# Patient Record
Sex: FEMALE | Race: WHITE | Employment: OTHER | URBAN - METROPOLITAN AREA
[De-identification: names, ages, dates, MRNs, and addresses within clinical notes are randomized per-mention and may not be internally consistent; named-entity substitution may affect disease eponyms.]

---

## 2022-11-09 ENCOUNTER — HOSPITAL ENCOUNTER (EMERGENCY)
Dept: GENERAL RADIOLOGY | Age: 82
Discharge: HOME | DRG: 287 | End: 2022-11-12
Payer: COMMERCIAL

## 2022-11-09 ENCOUNTER — HOSPITAL ENCOUNTER (INPATIENT)
Age: 82
LOS: 2 days | Discharge: HOME | DRG: 287 | End: 2022-11-11
Attending: EMERGENCY MEDICINE | Admitting: INTERNAL MEDICINE
Payer: COMMERCIAL

## 2022-11-09 ENCOUNTER — APPOINTMENT (OUTPATIENT)
Dept: NON INVASIVE DIAGNOSTICS | Age: 82
DRG: 287 | End: 2022-11-09
Payer: COMMERCIAL

## 2022-11-09 DIAGNOSIS — R07.9 CHEST PAIN: ICD-10-CM

## 2022-11-09 DIAGNOSIS — I24.9 ACS (ACUTE CORONARY SYNDROME) (HCC): Primary | ICD-10-CM

## 2022-11-09 DIAGNOSIS — I10 HYPERTENSION, UNCONTROLLED: ICD-10-CM

## 2022-11-09 DIAGNOSIS — R00.1 BRADYCARDIA: ICD-10-CM

## 2022-11-09 LAB
25(OH)D3 SERPL-MCNC: 49.6 NG/ML (ref 30–100)
ALBUMIN SERPL-MCNC: 4.4 G/DL (ref 3.2–4.6)
ALBUMIN/GLOB SERPL: 1.5 {RATIO} (ref 0.4–1.6)
ALP SERPL-CCNC: 85 U/L (ref 50–136)
ALT SERPL-CCNC: 23 U/L (ref 12–65)
ANION GAP SERPL CALC-SCNC: 2 MMOL/L (ref 2–11)
AST SERPL-CCNC: 15 U/L (ref 15–37)
BILIRUB SERPL-MCNC: 0.5 MG/DL (ref 0.2–1.1)
BUN SERPL-MCNC: 13 MG/DL (ref 8–23)
CALCIUM SERPL-MCNC: 9.8 MG/DL (ref 8.3–10.4)
CHLORIDE SERPL-SCNC: 106 MMOL/L (ref 101–110)
CHOLEST SERPL-MCNC: 132 MG/DL
CO2 SERPL-SCNC: 31 MMOL/L (ref 21–32)
CREAT SERPL-MCNC: 0.9 MG/DL (ref 0.6–1)
ECHO AO ASC DIAM: 3.8 CM
ECHO AO ASCENDING AORTA INDEX: 2.38 CM/M2
ECHO AO ROOT DIAM: 3.3 CM
ECHO AO ROOT INDEX: 2.06 CM/M2
ECHO AR MAX VEL PISA: 4.3 M/S
ECHO AV AREA PEAK VELOCITY: 2.4 CM2
ECHO AV AREA VTI: 2.3 CM2
ECHO AV AREA/BSA PEAK VELOCITY: 1.5 CM2/M2
ECHO AV AREA/BSA VTI: 1.4 CM2/M2
ECHO AV MEAN GRADIENT: 9 MMHG
ECHO AV MEAN VELOCITY: 1.4 M/S
ECHO AV PEAK GRADIENT: 20 MMHG
ECHO AV PEAK VELOCITY: 2.2 M/S
ECHO AV REGURGITANT PHT: 629 MS
ECHO AV VELOCITY RATIO: 0.64
ECHO AV VTI: 46.9 CM
ECHO BSA: 1.63 M2
ECHO EST RA PRESSURE: 5 MMHG
ECHO LA AREA 2C: 26.5 CM2
ECHO LA AREA 4C: 26.6 CM2
ECHO LA MAJOR AXIS: 6.5 CM
ECHO LA MINOR AXIS: 6.6 CM
ECHO LA VOL 2C: 89 ML (ref 22–52)
ECHO LA VOL 4C: 86 ML (ref 22–52)
ECHO LA VOL BP: 88 ML (ref 22–52)
ECHO LA VOL/BSA BIPLANE: 55 ML/M2 (ref 16–34)
ECHO LA VOLUME INDEX A2C: 56 ML/M2 (ref 16–34)
ECHO LA VOLUME INDEX A4C: 54 ML/M2 (ref 16–34)
ECHO LV E' LATERAL VELOCITY: 6 CM/S
ECHO LV E' SEPTAL VELOCITY: 4 CM/S
ECHO LV EDV A2C: 53 ML
ECHO LV EDV A4C: 83 ML
ECHO LV EDV INDEX A4C: 52 ML/M2
ECHO LV EDV NDEX A2C: 33 ML/M2
ECHO LV EJECTION FRACTION A2C: 60 %
ECHO LV EJECTION FRACTION A4C: 62 %
ECHO LV EJECTION FRACTION BIPLANE: 62 % (ref 55–100)
ECHO LV ESV A2C: 21 ML
ECHO LV ESV A4C: 31 ML
ECHO LV ESV INDEX A2C: 13 ML/M2
ECHO LV ESV INDEX A4C: 19 ML/M2
ECHO LV FRACTIONAL SHORTENING: 30 % (ref 28–44)
ECHO LV INTERNAL DIMENSION DIASTOLE INDEX: 2.5 CM/M2
ECHO LV INTERNAL DIMENSION DIASTOLIC: 4 CM (ref 3.9–5.3)
ECHO LV INTERNAL DIMENSION SYSTOLIC INDEX: 1.75 CM/M2
ECHO LV INTERNAL DIMENSION SYSTOLIC: 2.8 CM
ECHO LV IVSD: 1 CM (ref 0.6–0.9)
ECHO LV MASS 2D: 136.2 G (ref 67–162)
ECHO LV MASS INDEX 2D: 85.1 G/M2 (ref 43–95)
ECHO LV POSTERIOR WALL DIASTOLIC: 1.1 CM (ref 0.6–0.9)
ECHO LV RELATIVE WALL THICKNESS RATIO: 0.55
ECHO LVOT AREA: 3.8 CM2
ECHO LVOT AV VTI INDEX: 0.6
ECHO LVOT DIAM: 2.2 CM
ECHO LVOT MEAN GRADIENT: 3 MMHG
ECHO LVOT PEAK GRADIENT: 8 MMHG
ECHO LVOT PEAK VELOCITY: 1.4 M/S
ECHO LVOT STROKE VOLUME INDEX: 66.5 ML/M2
ECHO LVOT SV: 106.4 ML
ECHO LVOT VTI: 28 CM
ECHO MV A VELOCITY: 0.78 M/S
ECHO MV E DECELERATION TIME (DT): 229 MS
ECHO MV E VELOCITY: 0.79 M/S
ECHO MV E/A RATIO: 1.01
ECHO MV E/E' LATERAL: 13.17
ECHO MV E/E' RATIO (AVERAGED): 16.46
ECHO MV E/E' SEPTAL: 19.75
ECHO PV ACCELERATION TIME (AT): 106 MS
ECHO PV MAX VELOCITY: 1.2 M/S
ECHO PV PEAK GRADIENT: 5 MMHG
ECHO RIGHT VENTRICULAR SYSTOLIC PRESSURE (RVSP): 33 MMHG
ECHO RV BASAL DIMENSION: 3.4 CM
ECHO RV FREE WALL PEAK S': 14 CM/S
ECHO RV TAPSE: 1.8 CM (ref 1.7–?)
ECHO TV REGURGITANT MAX VELOCITY: 2.63 M/S
ECHO TV REGURGITANT PEAK GRADIENT: 28 MMHG
EKG ATRIAL RATE: 46 BPM
EKG DIAGNOSIS: NORMAL
EKG P AXIS: 42 DEGREES
EKG P-R INTERVAL: 158 MS
EKG Q-T INTERVAL: 480 MS
EKG QRS DURATION: 88 MS
EKG QTC CALCULATION (BAZETT): 420 MS
EKG R AXIS: 11 DEGREES
EKG T AXIS: 4 DEGREES
EKG VENTRICULAR RATE: 46 BPM
ERYTHROCYTE [DISTWIDTH] IN BLOOD BY AUTOMATED COUNT: 13.1 % (ref 11.9–14.6)
GLOBULIN SER CALC-MCNC: 2.9 G/DL (ref 2.8–4.5)
GLUCOSE SERPL-MCNC: 108 MG/DL (ref 65–100)
HCT VFR BLD AUTO: 41.6 % (ref 35.8–46.3)
HDLC SERPL-MCNC: 49 MG/DL (ref 40–60)
HDLC SERPL: 2.7 {RATIO}
HGB BLD-MCNC: 13.6 G/DL (ref 11.7–15.4)
LDLC SERPL CALC-MCNC: 69.2 MG/DL
LV EF: 63 %
LVEF MODALITY: ABNORMAL
MAGNESIUM SERPL-MCNC: 2.2 MG/DL (ref 1.8–2.4)
MCH RBC QN AUTO: 30.9 PG (ref 26.1–32.9)
MCHC RBC AUTO-ENTMCNC: 32.7 G/DL (ref 31.4–35)
MCV RBC AUTO: 94.5 FL (ref 82–102)
NRBC # BLD: 0 K/UL (ref 0–0.2)
PLATELET # BLD AUTO: 216 K/UL (ref 150–450)
PMV BLD AUTO: 9.4 FL (ref 9.4–12.3)
POTASSIUM SERPL-SCNC: 3.7 MMOL/L (ref 3.5–5.1)
PROT SERPL-MCNC: 7.3 G/DL (ref 6.3–8.2)
RBC # BLD AUTO: 4.4 M/UL (ref 4.05–5.2)
SODIUM SERPL-SCNC: 139 MMOL/L (ref 133–143)
TRIGL SERPL-MCNC: 69 MG/DL (ref 35–150)
TROPONIN I SERPL HS-MCNC: 10.3 PG/ML (ref 0–14)
TROPONIN I SERPL HS-MCNC: 11.6 PG/ML (ref 0–14)
TSH W FREE THYROID IF ABNORMAL: 0.7 UIU/ML (ref 0.36–3.74)
VLDLC SERPL CALC-MCNC: 13.8 MG/DL (ref 6–23)
WBC # BLD AUTO: 2.7 K/UL (ref 4.3–11.1)

## 2022-11-09 PROCEDURE — 85027 COMPLETE CBC AUTOMATED: CPT

## 2022-11-09 PROCEDURE — 36415 COLL VENOUS BLD VENIPUNCTURE: CPT

## 2022-11-09 PROCEDURE — 93306 TTE W/DOPPLER COMPLETE: CPT

## 2022-11-09 PROCEDURE — 6370000000 HC RX 637 (ALT 250 FOR IP): Performed by: INTERNAL MEDICINE

## 2022-11-09 PROCEDURE — 2580000003 HC RX 258: Performed by: INTERNAL MEDICINE

## 2022-11-09 PROCEDURE — 99223 1ST HOSP IP/OBS HIGH 75: CPT | Performed by: INTERNAL MEDICINE

## 2022-11-09 PROCEDURE — 84443 ASSAY THYROID STIM HORMONE: CPT

## 2022-11-09 PROCEDURE — 1100000003 HC PRIVATE W/ TELEMETRY

## 2022-11-09 PROCEDURE — 84484 ASSAY OF TROPONIN QUANT: CPT

## 2022-11-09 PROCEDURE — 80061 LIPID PANEL: CPT

## 2022-11-09 PROCEDURE — 71045 X-RAY EXAM CHEST 1 VIEW: CPT

## 2022-11-09 PROCEDURE — 99285 EMERGENCY DEPT VISIT HI MDM: CPT

## 2022-11-09 PROCEDURE — 93306 TTE W/DOPPLER COMPLETE: CPT | Performed by: INTERNAL MEDICINE

## 2022-11-09 PROCEDURE — 93005 ELECTROCARDIOGRAM TRACING: CPT | Performed by: EMERGENCY MEDICINE

## 2022-11-09 PROCEDURE — 82306 VITAMIN D 25 HYDROXY: CPT

## 2022-11-09 PROCEDURE — 80053 COMPREHEN METABOLIC PANEL: CPT

## 2022-11-09 PROCEDURE — 6360000002 HC RX W HCPCS: Performed by: INTERNAL MEDICINE

## 2022-11-09 PROCEDURE — 83735 ASSAY OF MAGNESIUM: CPT

## 2022-11-09 PROCEDURE — 6370000000 HC RX 637 (ALT 250 FOR IP): Performed by: EMERGENCY MEDICINE

## 2022-11-09 RX ORDER — VALSARTAN 40 MG/1
40 TABLET ORAL DAILY
Status: DISCONTINUED | OUTPATIENT
Start: 2022-11-09 | End: 2022-11-10

## 2022-11-09 RX ORDER — ONDANSETRON 4 MG/1
4 TABLET, ORALLY DISINTEGRATING ORAL EVERY 8 HOURS PRN
Status: DISCONTINUED | OUTPATIENT
Start: 2022-11-09 | End: 2022-11-11 | Stop reason: HOSPADM

## 2022-11-09 RX ORDER — ASPIRIN 81 MG/1
324 TABLET, CHEWABLE ORAL ONCE
Status: COMPLETED | OUTPATIENT
Start: 2022-11-09 | End: 2022-11-09

## 2022-11-09 RX ORDER — SODIUM CHLORIDE 9 MG/ML
INJECTION, SOLUTION INTRAVENOUS PRN
Status: DISCONTINUED | OUTPATIENT
Start: 2022-11-09 | End: 2022-11-11 | Stop reason: HOSPADM

## 2022-11-09 RX ORDER — ASPIRIN 81 MG/1
81 TABLET, CHEWABLE ORAL DAILY
Status: DISCONTINUED | OUTPATIENT
Start: 2022-11-10 | End: 2022-11-10 | Stop reason: SDUPTHER

## 2022-11-09 RX ORDER — SODIUM CHLORIDE 0.9 % (FLUSH) 0.9 %
5-40 SYRINGE (ML) INJECTION PRN
Status: DISCONTINUED | OUTPATIENT
Start: 2022-11-09 | End: 2022-11-11 | Stop reason: HOSPADM

## 2022-11-09 RX ORDER — ACETAMINOPHEN 650 MG/1
650 SUPPOSITORY RECTAL EVERY 6 HOURS PRN
Status: DISCONTINUED | OUTPATIENT
Start: 2022-11-09 | End: 2022-11-11 | Stop reason: HOSPADM

## 2022-11-09 RX ORDER — POLYETHYLENE GLYCOL 3350 17 G/17G
17 POWDER, FOR SOLUTION ORAL DAILY PRN
Status: DISCONTINUED | OUTPATIENT
Start: 2022-11-09 | End: 2022-11-11 | Stop reason: HOSPADM

## 2022-11-09 RX ORDER — ASPIRIN 81 MG/1
81 TABLET, CHEWABLE ORAL DAILY
Status: DISCONTINUED | OUTPATIENT
Start: 2022-11-10 | End: 2022-11-11 | Stop reason: HOSPADM

## 2022-11-09 RX ORDER — ONDANSETRON 2 MG/ML
4 INJECTION INTRAMUSCULAR; INTRAVENOUS EVERY 6 HOURS PRN
Status: DISCONTINUED | OUTPATIENT
Start: 2022-11-09 | End: 2022-11-11 | Stop reason: HOSPADM

## 2022-11-09 RX ORDER — ENOXAPARIN SODIUM 100 MG/ML
40 INJECTION SUBCUTANEOUS DAILY
Status: DISCONTINUED | OUTPATIENT
Start: 2022-11-09 | End: 2022-11-11 | Stop reason: HOSPADM

## 2022-11-09 RX ORDER — SODIUM CHLORIDE 0.9 % (FLUSH) 0.9 %
5-40 SYRINGE (ML) INJECTION EVERY 12 HOURS SCHEDULED
Status: DISCONTINUED | OUTPATIENT
Start: 2022-11-09 | End: 2022-11-11 | Stop reason: HOSPADM

## 2022-11-09 RX ORDER — ACETAMINOPHEN 325 MG/1
650 TABLET ORAL EVERY 6 HOURS PRN
Status: DISCONTINUED | OUTPATIENT
Start: 2022-11-09 | End: 2022-11-11 | Stop reason: HOSPADM

## 2022-11-09 RX ORDER — ATORVASTATIN CALCIUM 40 MG/1
40 TABLET, FILM COATED ORAL NIGHTLY
Status: DISCONTINUED | OUTPATIENT
Start: 2022-11-09 | End: 2022-11-11 | Stop reason: HOSPADM

## 2022-11-09 RX ADMIN — ACETAMINOPHEN 650 MG: 325 TABLET ORAL at 21:37

## 2022-11-09 RX ADMIN — VALSARTAN 40 MG: 40 TABLET, FILM COATED ORAL at 15:52

## 2022-11-09 RX ADMIN — ATORVASTATIN CALCIUM 40 MG: 40 TABLET, FILM COATED ORAL at 21:28

## 2022-11-09 RX ADMIN — ENOXAPARIN SODIUM 40 MG: 100 INJECTION SUBCUTANEOUS at 15:56

## 2022-11-09 RX ADMIN — ASPIRIN 324 MG: 81 TABLET, CHEWABLE ORAL at 14:34

## 2022-11-09 RX ADMIN — NITROGLYCERIN 1 INCH: 20 OINTMENT TOPICAL at 14:31

## 2022-11-09 RX ADMIN — SODIUM CHLORIDE, PRESERVATIVE FREE 10 ML: 5 INJECTION INTRAVENOUS at 21:28

## 2022-11-09 ASSESSMENT — ENCOUNTER SYMPTOMS
NAUSEA: 1
SHORTNESS OF BREATH: 1

## 2022-11-09 ASSESSMENT — PAIN - FUNCTIONAL ASSESSMENT: PAIN_FUNCTIONAL_ASSESSMENT: NONE - DENIES PAIN

## 2022-11-09 ASSESSMENT — PAIN SCALES - GENERAL: PAINLEVEL_OUTOF10: 3

## 2022-11-09 ASSESSMENT — PAIN DESCRIPTION - LOCATION: LOCATION: HEAD

## 2022-11-09 NOTE — ED TRIAGE NOTES
Pt ambulatory to triage with family for reports of low heart rate. Pt family states they went to their doctors office & found that her heart rate was 43. Pt takes BP medications but has never had a low heart rate before. Pt denies chest pain at this time but has had chest pains intermittently for the past 2 weeks. Pt also hypertensive at doctors office but has not yet taken her medications this morning. Pt a&ox4.

## 2022-11-09 NOTE — Clinical Note
TRANSFER - IN REPORT:     Verbal report received from: Room 302 RN. Report consisted of patient's Situation, Background, Assessment and   Recommendations(SBAR). Opportunity for questions and clarification was provided. Assessment completed upon patient's arrival to unit and care assumed. Patient transported with a Registered Nurse.

## 2022-11-09 NOTE — ED PROVIDER NOTES
Emergency Department Provider Note                   PCP:                No primary care provider on file. Age: [de-identified] y.o. Sex: female       ICD-10-CM    1. ACS (acute coronary syndrome) (HCC)  I24.9       2. Bradycardia  R00.1       3. Hypertension, uncontrolled  I10           DISPOSITION Decision To Admit 11/09/2022 02:37:05 PM       MDM  Number of Diagnoses or Management Options  ACS (acute coronary syndrome) (HCC)  Bradycardia  Chest pain  Hypertension, uncontrolled  Diagnosis management comments: Patient with EKG here that shows a sinus bradycardia versus 2-1 block versus U wave versus atrial ectopic beat. Her chest pain is concerning for cardiac chest pain. Aspirin given and nitropaste applied. BP improved. 2:30 PM  D/w Dr. Juan Roman for admission.        Amount and/or Complexity of Data Reviewed  Clinical lab tests: ordered and reviewed  Tests in the radiology section of CPT®: ordered and reviewed  Review and summarize past medical records: yes  Discuss the patient with other providers: yes  Independent visualization of images, tracings, or specimens: yes    Risk of Complications, Morbidity, and/or Mortality  Presenting problems: high  Diagnostic procedures: minimal  Management options: high    Patient Progress  Patient progress: improved             Orders Placed This Encounter   Procedures    XR CHEST PORTABLE    CBC    Comprehensive Metabolic Panel    Troponin    Magnesium    Cardiac Monitor    Pulse Oximetry    EKG 12 Lead    Saline lock IV        Medications   nitroglycerin (NITRO-BID) 2 % ointment 1 inch (1 inch Topical Given 11/9/22 1431)   aspirin chewable tablet 324 mg (324 mg Oral Given 11/9/22 1434)       New Prescriptions    No medications on file        Santiago Jerry is a [de-identified] y.o. female who presents to the Emergency Department with chief complaint of    Chief Complaint   Patient presents with    Bradycardia      Presents with complaint of low heart rate and blood pressure. Patient has not taken her blood pressure medicines today. She last took them at 8 PM yesterday. Patient was at her doctor's office for regular checkup and it was noted her heart rate was low and her blood pressure was high. She has not noticed her rate being low. She reports for the past 15 days that she has had intermittent chest pain that has increased in frequency to approximately 4-5 times per day. It lasts 15 to 30 minutes at a time. It is worse with exertion. She gets short of breath nauseated and diaphoretic with the pain. She describes the pain as a pressure and also causes a headache. She has not tried anything to help with the pain. She has not had any new medication changes. She does not smoke or drink. She does not have any family history of heart disease. She has never seen a cardiologist.  She was seen with the  and her daughter who speaks [place]. The history is provided by the patient and a relative. The history is limited by a language barrier. A  was used. All other systems reviewed and are negative unless otherwise stated in the history of present illness section. Review of Systems   Constitutional:  Negative for chills and fever. Respiratory:  Positive for shortness of breath. Cardiovascular:  Positive for chest pain. Negative for palpitations and leg swelling. Gastrointestinal:  Positive for nausea. Skin:  Negative for rash and wound. Neurological:  Positive for headaches. Negative for syncope. Psychiatric/Behavioral:  Negative for agitation and behavioral problems. All other systems reviewed and are negative. No past medical history on file. No past surgical history on file. No family history on file.      Social History     Socioeconomic History    Marital status: Unknown        Allergies: Penicillins    Previous Medications    No medications on file        Vitals signs and nursing note reviewed. Patient Vitals for the past 4 hrs:   Temp Pulse Resp BP SpO2   11/09/22 1230 98.3 °F (36.8 °C) (!) 49 18 (!) 207/99 98 %          Physical Exam  Vitals and nursing note reviewed. Constitutional:       General: She is not in acute distress. Appearance: Normal appearance. She is obese. She is not ill-appearing. HENT:      Head: Normocephalic and atraumatic. Nose: Nose normal.      Mouth/Throat:      Mouth: Mucous membranes are moist.      Pharynx: Oropharynx is clear. Eyes:      General:         Right eye: No discharge. Left eye: No discharge. Conjunctiva/sclera: Conjunctivae normal.   Cardiovascular:      Rate and Rhythm: Normal rate and regular rhythm. Comments: She has mild tenderness with palpation  Pulmonary:      Effort: Pulmonary effort is normal. No respiratory distress. Breath sounds: Normal breath sounds. No wheezing. Abdominal:      Palpations: Abdomen is soft. Tenderness: There is no abdominal tenderness. There is no guarding. Musculoskeletal:         General: Normal range of motion. Cervical back: Normal range of motion and neck supple. Right lower leg: No edema. Left lower leg: No edema. Skin:     General: Skin is warm and dry. Neurological:      General: No focal deficit present. Mental Status: She is alert and oriented to person, place, and time. Psychiatric:         Mood and Affect: Mood normal.         Behavior: Behavior normal.        Procedures    ED EKG Interpretation  EKG was interpreted in the absence of a cardiologist.    Rate: 46  EKG Interpretation: bradycardia with ? block  ST Segments: Nonspecific ST segments - NO STEMI    Results for orders placed or performed during the hospital encounter of 11/09/22   XR CHEST PORTABLE    Narrative    XR CHEST PORTABLE 11/9/2022 1:02 PM    HISTORY: Chest pain. Bradycardia. COMPARISON: None available. A portable AP view of the chest was obtained.       Impression The lungs are clear. Cardiomegaly. No pneumothorax. No pleural  effusions. CBC   Result Value Ref Range    WBC 2.7 (L) 4.3 - 11.1 K/uL    RBC 4.40 4.05 - 5.2 M/uL    Hemoglobin 13.6 11.7 - 15.4 g/dL    Hematocrit 41.6 35.8 - 46.3 %    MCV 94.5 82 - 102 FL    MCH 30.9 26.1 - 32.9 PG    MCHC 32.7 31.4 - 35.0 g/dL    RDW 13.1 11.9 - 14.6 %    Platelets 917 551 - 810 K/uL    MPV 9.4 9.4 - 12.3 FL    nRBC 0.00 0.0 - 0.2 K/uL   Comprehensive Metabolic Panel   Result Value Ref Range    Sodium 139 133 - 143 mmol/L    Potassium 3.7 3.5 - 5.1 mmol/L    Chloride 106 101 - 110 mmol/L    CO2 31 21 - 32 mmol/L    Anion Gap 2 2 - 11 mmol/L    Glucose 108 (H) 65 - 100 mg/dL    BUN 13 8 - 23 MG/DL    Creatinine 0.90 0.6 - 1.0 MG/DL    Est, Glom Filt Rate >60 >60 ml/min/1.73m2    Calcium 9.8 8.3 - 10.4 MG/DL    Total Bilirubin 0.5 0.2 - 1.1 MG/DL    ALT 23 12 - 65 U/L    AST 15 15 - 37 U/L    Alk Phosphatase 85 50 - 136 U/L    Total Protein 7.3 6.3 - 8.2 g/dL    Albumin 4.4 3.2 - 4.6 g/dL    Globulin 2.9 2.8 - 4.5 g/dL    Albumin/Globulin Ratio 1.5 0.4 - 1.6     Troponin   Result Value Ref Range    Troponin, High Sensitivity 10.3 0 - 14 pg/mL   Magnesium   Result Value Ref Range    Magnesium 2.2 1.8 - 2.4 mg/dL   EKG 12 Lead   Result Value Ref Range    Ventricular Rate 46 BPM    Atrial Rate 46 BPM    P-R Interval 158 ms    QRS Duration 88 ms    Q-T Interval 480 ms    QTc Calculation (Bazett) 420 ms    P Axis 42 degrees    R Axis 11 degrees    T Axis 4 degrees    Diagnosis Sinus bradycardia         XR CHEST PORTABLE   Final Result   The lungs are clear. Cardiomegaly. No pneumothorax. No pleural   effusions. Voice dictation software was used during the making of this note. This software is not perfect and grammatical and other typographical errors may be present. This note has not been completely proofread for errors.      Sabino Menon MD  11/09/22 3041

## 2022-11-09 NOTE — Clinical Note
After multiple catheter exchanges and unsuccessful attempts unable to access ostium of the RCA. Aborted right radial access and accessed the right groin.

## 2022-11-09 NOTE — H&P
Subjective:     Patient is a [de-identified] y.o.  female presents withchest pain. Onset of symptoms was gradual 2 weeks ago with gradually worsening course since that time. The chest pain is intermittent (10-20 minutes) and is rated as moderate. The chest pain. is random in occurrence and patient denies noticing it worsening with exertion. Symptoms improve with  nothing . Pt reports that she has been having intermittent episodes of sharp stabbing chest pain over the last two weeks. She states it occurs randomly and has not noticed any pattern to when it worsens. She reports feeling associated shortness of breath with her chest pain. No history of cardiac disease or other similar episodes in the past. Seen  at her PCP today and was noted to be bradycardic with rates in the 40s, and hypertensive with systolic blood pressures over 200. Pt reports that her last dose of BP medication was last night. There are no problems to display for this patient. No past medical history on file. No past surgical history on file. Not in a hospital admission. Allergies   Allergen Reactions    Penicillins Hives, Rash and Other (See Comments)      Social History     Tobacco Use    Smoking status: Not on file    Smokeless tobacco: Not on file   Substance Use Topics    Alcohol use: Not on file      No family history on file. Review of Systems  A comprehensive review of systems was negative except for: Respiratory: positive for shortness of breath  Cardiovascular: positive for chest pain    Objective:     Patient Vitals for the past 8 hrs:   BP Temp Temp src Pulse Resp SpO2 Height Weight   11/09/22 1230 (!) 207/99 98.3 °F (36.8 °C) Oral (!) 49 18 98 % 5' (1.524 m) 139 lb (63 kg)     No intake/output data recorded. No intake/output data recorded. Physical Exam:    Physical Examination: General appearance - Appearance: alert, well appearing, and in no distress.    Neck/lymph - supple, no significant adenopathy  Chest/CV - clear to auscultation, no wheezes, rales or rhonchi, symmetric air entry  Heart - bradycardic at 49 bpm, regular rhythm, normal S1, S2, no murmurs, rubs, clicks or gallops  Abdomen/GI - soft, nontender, nondistended, no masses or organomegaly   Musculoskeletal - no joint tenderness, deformity or swelling  Extremities - no pedal edema, no clubbing or cyanosis  Skin - normal coloration and turgor, no rashes, no suspicious skin lesions noted      Data included below includes an independent review by me    ECG: sinus bradycardia, no significant change from prior other than rate     Data Review:     Recent Results (from the past 24 hour(s))   EKG 12 Lead    Collection Time: 11/09/22 12:25 PM   Result Value Ref Range    Ventricular Rate 46 BPM    Atrial Rate 46 BPM    P-R Interval 158 ms    QRS Duration 88 ms    Q-T Interval 480 ms    QTc Calculation (Bazett) 420 ms    P Axis 42 degrees    R Axis 11 degrees    T Axis 4 degrees    Diagnosis Sinus bradycardia    CBC    Collection Time: 11/09/22 12:39 PM   Result Value Ref Range    WBC 2.7 (L) 4.3 - 11.1 K/uL    RBC 4.40 4.05 - 5.2 M/uL    Hemoglobin 13.6 11.7 - 15.4 g/dL    Hematocrit 41.6 35.8 - 46.3 %    MCV 94.5 82 - 102 FL    MCH 30.9 26.1 - 32.9 PG    MCHC 32.7 31.4 - 35.0 g/dL    RDW 13.1 11.9 - 14.6 %    Platelets 417 621 - 082 K/uL    MPV 9.4 9.4 - 12.3 FL    nRBC 0.00 0.0 - 0.2 K/uL   Comprehensive Metabolic Panel    Collection Time: 11/09/22 12:39 PM   Result Value Ref Range    Sodium 139 133 - 143 mmol/L    Potassium 3.7 3.5 - 5.1 mmol/L    Chloride 106 101 - 110 mmol/L    CO2 31 21 - 32 mmol/L    Anion Gap 2 2 - 11 mmol/L    Glucose 108 (H) 65 - 100 mg/dL    BUN 13 8 - 23 MG/DL    Creatinine 0.90 0.6 - 1.0 MG/DL    Est, Glom Filt Rate >60 >60 ml/min/1.73m2    Calcium 9.8 8.3 - 10.4 MG/DL    Total Bilirubin 0.5 0.2 - 1.1 MG/DL    ALT 23 12 - 65 U/L    AST 15 15 - 37 U/L    Alk Phosphatase 85 50 - 136 U/L    Total Protein 7.3 6.3 - 8.2 g/dL    Albumin 4.4 3.2 - 4.6 g/dL    Globulin 2.9 2.8 - 4.5 g/dL    Albumin/Globulin Ratio 1.5 0.4 - 1.6     Troponin    Collection Time: 11/09/22 12:39 PM   Result Value Ref Range    Troponin, High Sensitivity 10.3 0 - 14 pg/mL   Magnesium    Collection Time: 11/09/22 12:39 PM   Result Value Ref Range    Magnesium 2.2 1.8 - 2.4 mg/dL       Chest X-ray was negative for infiltrate, effusion, pneumothorax, and was positive for cardiomegaly    Assessment:     [unfilled]     Chest Pain:  - Patient reports intermittent episodes of chest pain over the last two weeks, described to the ER as being worse with exertion and associated with shortness of breath, diaphoresis, and nausea. Seen  at her PCP today and was noted to be bradycardic with rates in the 40s, and hypertensive with systolic blood pressures over 200. Pt reports that her last dose of BP medication was last night. - Pt stated to me that her chest pain is random in nature, unclear which is more accurate. Pt has not had cardiac work up in the past so will pursue non-invasive work  up of her chest pain. - Start pt on ASA 81 mg daily, given 324 mg of aspirin here in the ED  - Check echocardiogram and lipid panel    Bradycardia:  - Pt's HR consistently in the 40s, avoid AV nik blockers including beta blockers. - Monitor on telemetry    Hypertension:  - Poorly controlled, pt's granddaughter reports that it has not been well controlled at recent appointments. - Current medications include bisoprolol-HCTZ 2.5-6.25 mg and olmesartan 20 mg will avoid beta blockers at this time given patient's bradycardia    It is difficult to tell whether the patient's symptoms are consistent with angina or not it may just be that with her ST-T wave changes and her presentation with symptoms she needs a definitive cardiac cath. We will plan for this tomorrow.   In the meantime we will start an ARB for her hypertension we will check appropriate lab work including thyroid lipid renal function CBC    Pt set up for procedure. Risks benefits and alternatives discussed. Pt agrees to proceed. Risks of bleeding infection emergent surgical procedure loss of life or limb renal failure and other known risks discussed. Pt agrees to proceed and agrees to sign consent form. Current consent form has been signed and visualized and is completed in its entirety by all involved parties. Actual scanning of the paper consent into the chart takes place at a later date and is a process that I am not involved in nor can be held responsible for. Alicia Lawrence    I have personally seen and evaluated the patient and reviewed the students note and agree with the following assessment and plan and findings. I was present for and observed the key components of this note. Any appropriate additions or editing of the information have been done by me.     Blaire Shah MD, [facility]  Cardiology

## 2022-11-09 NOTE — Clinical Note
Contrast Dose Calculator:   Patient's age: [de-identified].   Patient's sex: Female. Patient weight (kg) = 61.2. Creatinine level (mg/dL) = 0.9. Creatinine clearance (mL/min): 48. Contrast concentration (mg/mL) = 370. MACD = 300 mL. Max Contrast dose per Creatinine Cl calculator = 108 mL.

## 2022-11-09 NOTE — ED NOTES
TRANSFER - OUT REPORT:    Verbal report given to Art RN on John Leger  being transferred to Bed 302 for routine progression of patient care       Report consisted of patient's Situation, Background, Assessment and   Recommendations(SBAR). Information from the following report(s) ED SBAR was reviewed with the receiving nurse. [place] Assessment: Presents to emergency department  because of falls (Syncope, seizure, or loss of consciousness): No, Age > 79: Yes, Altered Mental Status, Intoxication with alcohol or substance confusion (Disorientation, impaired judgment, poor safety awaremess, or inability to follow instructions): No, Impaired Mobility: Ambulates or transfers with assistive devices or assistance; Unable to ambulate or transer.: No, Nursing Judgement: No  Lines:   Peripheral IV 11/09/22 Left; Anterior Antecubital (Active)        Opportunity for questions and clarification was provided.       Patient transported with:  Transport         [facility]  11/09/22 1303

## 2022-11-09 NOTE — Clinical Note
Catheter exchanged over the wire with CATHETER DIAG AD 5FR L110CM 145DEG [facility]. Detail Level: Zone Detail Level: Simple Detail Level: Detailed

## 2022-11-10 ENCOUNTER — APPOINTMENT (OUTPATIENT)
Dept: ULTRASOUND IMAGING | Age: 82
DRG: 287 | End: 2022-11-10
Payer: COMMERCIAL

## 2022-11-10 PROBLEM — R00.1 BRADYCARDIA: Status: ACTIVE | Noted: 2022-11-10

## 2022-11-10 PROBLEM — I20.8 OTHER FORMS OF ANGINA PECTORIS (HCC): Status: ACTIVE | Noted: 2022-11-10

## 2022-11-10 PROBLEM — I10 HYPERTENSION, UNCONTROLLED: Status: ACTIVE | Noted: 2022-11-10

## 2022-11-10 LAB
ECHO BSA: 1.63 M2
ERYTHROCYTE [DISTWIDTH] IN BLOOD BY AUTOMATED COUNT: 13 % (ref 11.9–14.6)
HCT VFR BLD AUTO: 38.6 % (ref 35.8–46.3)
HGB BLD-MCNC: 12.8 G/DL (ref 11.7–15.4)
MCH RBC QN AUTO: 31.1 PG (ref 26.1–32.9)
MCHC RBC AUTO-ENTMCNC: 33.2 G/DL (ref 31.4–35)
MCV RBC AUTO: 93.7 FL (ref 82–102)
NRBC # BLD: 0 K/UL (ref 0–0.2)
NT PRO BNP: 800 PG/ML
PLATELET # BLD AUTO: 192 K/UL (ref 150–450)
PMV BLD AUTO: 9.6 FL (ref 9.4–12.3)
RBC # BLD AUTO: 4.12 M/UL (ref 4.05–5.2)
TROPONIN I SERPL HS-MCNC: 10.9 PG/ML (ref 0–14)
WBC # BLD AUTO: 2.8 K/UL (ref 4.3–11.1)

## 2022-11-10 PROCEDURE — 2580000003 HC RX 258: Performed by: INTERNAL MEDICINE

## 2022-11-10 PROCEDURE — 2500000003 HC RX 250 WO HCPCS: Performed by: INTERNAL MEDICINE

## 2022-11-10 PROCEDURE — C1769 GUIDE WIRE: HCPCS | Performed by: INTERNAL MEDICINE

## 2022-11-10 PROCEDURE — 6360000002 HC RX W HCPCS: Performed by: NURSE PRACTITIONER

## 2022-11-10 PROCEDURE — 36415 COLL VENOUS BLD VENIPUNCTURE: CPT

## 2022-11-10 PROCEDURE — 2709999900 HC NON-CHARGEABLE SUPPLY: Performed by: INTERNAL MEDICINE

## 2022-11-10 PROCEDURE — 6360000002 HC RX W HCPCS: Performed by: INTERNAL MEDICINE

## 2022-11-10 PROCEDURE — 93458 L HRT ARTERY/VENTRICLE ANGIO: CPT | Performed by: INTERNAL MEDICINE

## 2022-11-10 PROCEDURE — 85027 COMPLETE CBC AUTOMATED: CPT

## 2022-11-10 PROCEDURE — 76705 ECHO EXAM OF ABDOMEN: CPT

## 2022-11-10 PROCEDURE — 83880 ASSAY OF NATRIURETIC PEPTIDE: CPT

## 2022-11-10 PROCEDURE — 6370000000 HC RX 637 (ALT 250 FOR IP): Performed by: INTERNAL MEDICINE

## 2022-11-10 PROCEDURE — B2111ZZ FLUOROSCOPY OF MULTIPLE CORONARY ARTERIES USING LOW OSMOLAR CONTRAST: ICD-10-PCS | Performed by: INTERNAL MEDICINE

## 2022-11-10 PROCEDURE — 6360000004 HC RX CONTRAST MEDICATION: Performed by: INTERNAL MEDICINE

## 2022-11-10 PROCEDURE — 4A023N7 MEASUREMENT OF CARDIAC SAMPLING AND PRESSURE, LEFT HEART, PERCUTANEOUS APPROACH: ICD-10-PCS | Performed by: INTERNAL MEDICINE

## 2022-11-10 PROCEDURE — 99152 MOD SED SAME PHYS/QHP 5/>YRS: CPT | Performed by: INTERNAL MEDICINE

## 2022-11-10 PROCEDURE — 1100000003 HC PRIVATE W/ TELEMETRY

## 2022-11-10 PROCEDURE — 99153 MOD SED SAME PHYS/QHP EA: CPT | Performed by: INTERNAL MEDICINE

## 2022-11-10 PROCEDURE — C1760 CLOSURE DEV, VASC: HCPCS | Performed by: INTERNAL MEDICINE

## 2022-11-10 PROCEDURE — C1894 INTRO/SHEATH, NON-LASER: HCPCS | Performed by: INTERNAL MEDICINE

## 2022-11-10 DEVICE — ANGIO-SEAL VIP VASCULAR CLOSURE DEVICE
Type: IMPLANTABLE DEVICE | Site: GROIN | Status: FUNCTIONAL
Brand: ANGIO-SEAL

## 2022-11-10 RX ORDER — VALSARTAN 80 MG/1
80 TABLET ORAL EVERY 24 HOURS
Status: DISCONTINUED | OUTPATIENT
Start: 2022-11-10 | End: 2022-11-10

## 2022-11-10 RX ORDER — VALSARTAN 80 MG/1
80 TABLET ORAL EVERY 12 HOURS SCHEDULED
Status: DISCONTINUED | OUTPATIENT
Start: 2022-11-11 | End: 2022-11-11

## 2022-11-10 RX ORDER — MIDAZOLAM HYDROCHLORIDE 1 MG/ML
INJECTION INTRAMUSCULAR; INTRAVENOUS PRN
Status: DISCONTINUED | OUTPATIENT
Start: 2022-11-10 | End: 2022-11-10 | Stop reason: HOSPADM

## 2022-11-10 RX ORDER — SODIUM CHLORIDE 9 MG/ML
INJECTION, SOLUTION INTRAVENOUS CONTINUOUS
Status: ACTIVE | OUTPATIENT
Start: 2022-11-10 | End: 2022-11-11

## 2022-11-10 RX ORDER — SODIUM CHLORIDE 0.9 % (FLUSH) 0.9 %
5-40 SYRINGE (ML) INJECTION EVERY 12 HOURS SCHEDULED
Status: DISCONTINUED | OUTPATIENT
Start: 2022-11-10 | End: 2022-11-11 | Stop reason: HOSPADM

## 2022-11-10 RX ORDER — BISOPROLOL FUMARATE AND HYDROCHLOROTHIAZIDE 2.5; 6.25 MG/1; MG/1
1 TABLET ORAL DAILY
Status: ON HOLD | COMMUNITY
End: 2022-11-11 | Stop reason: HOSPADM

## 2022-11-10 RX ORDER — MELOXICAM 7.5 MG/1
7.5 TABLET ORAL 2 TIMES DAILY WITH MEALS
COMMUNITY

## 2022-11-10 RX ORDER — SODIUM CHLORIDE 0.9 % (FLUSH) 0.9 %
5-40 SYRINGE (ML) INJECTION PRN
Status: DISCONTINUED | OUTPATIENT
Start: 2022-11-10 | End: 2022-11-11 | Stop reason: HOSPADM

## 2022-11-10 RX ORDER — LIDOCAINE HYDROCHLORIDE 10 MG/ML
INJECTION, SOLUTION INFILTRATION; PERINEURAL PRN
Status: DISCONTINUED | OUTPATIENT
Start: 2022-11-10 | End: 2022-11-10 | Stop reason: HOSPADM

## 2022-11-10 RX ORDER — HYDRALAZINE HYDROCHLORIDE 20 MG/ML
INJECTION INTRAMUSCULAR; INTRAVENOUS PRN
Status: DISCONTINUED | OUTPATIENT
Start: 2022-11-10 | End: 2022-11-10 | Stop reason: HOSPADM

## 2022-11-10 RX ORDER — HYDRALAZINE HYDROCHLORIDE 20 MG/ML
10 INJECTION INTRAMUSCULAR; INTRAVENOUS EVERY 4 HOURS PRN
Status: DISCONTINUED | OUTPATIENT
Start: 2022-11-10 | End: 2022-11-11 | Stop reason: HOSPADM

## 2022-11-10 RX ORDER — ACETAMINOPHEN 325 MG/1
650 TABLET ORAL EVERY 4 HOURS PRN
Status: DISCONTINUED | OUTPATIENT
Start: 2022-11-10 | End: 2022-11-11 | Stop reason: HOSPADM

## 2022-11-10 RX ORDER — OLMESARTAN MEDOXOMIL 40 MG/1
40 TABLET ORAL
COMMUNITY

## 2022-11-10 RX ORDER — PROMETHAZINE HYDROCHLORIDE 25 MG/ML
12.5 INJECTION, SOLUTION INTRAMUSCULAR; INTRAVENOUS
Status: COMPLETED | OUTPATIENT
Start: 2022-11-10 | End: 2022-11-10

## 2022-11-10 RX ORDER — SODIUM CHLORIDE 9 MG/ML
INJECTION, SOLUTION INTRAVENOUS CONTINUOUS
Status: DISCONTINUED | OUTPATIENT
Start: 2022-11-10 | End: 2022-11-11 | Stop reason: HOSPADM

## 2022-11-10 RX ORDER — MAGNESIUM HYDROXIDE/ALUMINUM HYDROXICE/SIMETHICONE 120; 1200; 1200 MG/30ML; MG/30ML; MG/30ML
30 SUSPENSION ORAL
Status: COMPLETED | OUTPATIENT
Start: 2022-11-10 | End: 2022-11-10

## 2022-11-10 RX ORDER — HEPARIN SODIUM 200 [USP'U]/100ML
INJECTION, SOLUTION INTRAVENOUS CONTINUOUS PRN
Status: DISCONTINUED | OUTPATIENT
Start: 2022-11-10 | End: 2022-11-10 | Stop reason: HOSPADM

## 2022-11-10 RX ORDER — AMLODIPINE BESYLATE 5 MG/1
5 TABLET ORAL DAILY
Status: DISCONTINUED | OUTPATIENT
Start: 2022-11-11 | End: 2022-11-11 | Stop reason: HOSPADM

## 2022-11-10 RX ADMIN — ONDANSETRON 4 MG: 2 INJECTION INTRAMUSCULAR; INTRAVENOUS at 16:28

## 2022-11-10 RX ADMIN — SODIUM CHLORIDE, PRESERVATIVE FREE 5 ML: 5 INJECTION INTRAVENOUS at 09:00

## 2022-11-10 RX ADMIN — ALUMINUM HYDROXIDE, MAGNESIUM HYDROXIDE, AND SIMETHICONE 30 ML: 200; 200; 20 SUSPENSION ORAL at 16:41

## 2022-11-10 RX ADMIN — ASPIRIN 81 MG: 81 TABLET, CHEWABLE ORAL at 10:43

## 2022-11-10 RX ADMIN — ENOXAPARIN SODIUM 40 MG: 100 INJECTION SUBCUTANEOUS at 18:44

## 2022-11-10 RX ADMIN — PROMETHAZINE HYDROCHLORIDE 12.5 MG: 25 INJECTION INTRAMUSCULAR; INTRAVENOUS at 18:09

## 2022-11-10 RX ADMIN — SODIUM CHLORIDE: 9 INJECTION, SOLUTION INTRAVENOUS at 18:41

## 2022-11-10 RX ADMIN — SODIUM CHLORIDE: 9 INJECTION, SOLUTION INTRAVENOUS at 06:59

## 2022-11-10 RX ADMIN — ACETAMINOPHEN 650 MG: 325 TABLET ORAL at 04:31

## 2022-11-10 RX ADMIN — ATORVASTATIN CALCIUM 40 MG: 40 TABLET, FILM COATED ORAL at 21:17

## 2022-11-10 RX ADMIN — ACETAMINOPHEN 650 MG: 325 TABLET, FILM COATED ORAL at 17:35

## 2022-11-10 RX ADMIN — SODIUM CHLORIDE, PRESERVATIVE FREE 10 ML: 5 INJECTION INTRAVENOUS at 21:17

## 2022-11-10 ASSESSMENT — PAIN SCALES - GENERAL
PAINLEVEL_OUTOF10: 6
PAINLEVEL_OUTOF10: 4
PAINLEVEL_OUTOF10: 3
PAINLEVEL_OUTOF10: 0
PAINLEVEL_OUTOF10: 6

## 2022-11-10 ASSESSMENT — PAIN DESCRIPTION - LOCATION
LOCATION: BACK
LOCATION: HEAD
LOCATION: CHEST

## 2022-11-10 ASSESSMENT — PAIN DESCRIPTION - ORIENTATION: ORIENTATION: LEFT

## 2022-11-10 NOTE — PROGRESS NOTES
Arrangements have been made for  an Onsite  on 11/10/22 to cover Cardiac Catheterization . Video remote  unit also available as a backup option.     Thank you,        Mireya Melendez [facility] [employer]        [phone] (phone)

## 2022-11-10 NOTE — PROGRESS NOTES
Patient speaks Mongolian as their preferred language for their healthcare communication. If there are technical problems using the [employer] mobil unit, please contact Language Services for interpretation at:    Senior My[employer] -Navigator ([phone])  General phone: [phone] ( [phone])  Email: [email or web address]    Please always document the use of interpreting services (name and/or 's ID number) in your clinical notes. Our interpreters are available for team members working with limited  English proficient (LEP) patients remotely, in person (if needed for special cases), as phone or video interpreters on the [employer] Mobil units.         Thank you,        Taylor ORR  Senior /Navigator

## 2022-11-10 NOTE — PROGRESS NOTES
Report received from [place] [id number]. Procedural findings communicated. Intra procedural  medication administration reviewed. Progression of care discussed.      Patient received into [id number] [facility] 3 post sheath removal.     Access site without bleeding or swelling yes    Dressing dry and intact yes    Patient instructed to limit movement to right upper and lower extremity    Routine post procedural vital signs and site assessment initiated yes

## 2022-11-10 NOTE — PROGRESS NOTES
TRANSFER - OUT REPORT:    Verbal report given to RN on Terrance Nicolas  being transferred to [address] [place] [address] for routine progression of patient care       Report consisted of patient's Situation, Background, Assessment and   Recommendations(SBAR). Information from the following report(s) Nurse Handoff Report was reviewed with the receiving nurse. Lines:   Peripheral IV 11/09/22 Left; Anterior Antecubital (Active)   Site Assessment Clean, dry & intact 11/10/22 1212   Line Status Flushed; Infusing 11/10/22 1212   Line Care Connections checked and tightened 11/10/22 1212   Phlebitis Assessment No symptoms 11/10/22 1212   Infiltration Assessment 0 11/10/22 1212   Alcohol Cap Used Yes 11/10/22 1212   Dressing Status Clean, dry & intact 11/10/22 1212   Dressing Type Transparent 11/10/22 1212        Opportunity for questions and clarification was provided.       Patient transported with:  Registered Nurse    [facility] cadence Hung  Diagnostic  RRA - 12  RFA - 6 fr Angioseal    1 mg Versed  25 mcg Fentanyl  2000 units Heparin  20 mg Hydralazine

## 2022-11-10 NOTE — PROGRESS NOTES
Patient complains of headache Tylenol 650 mg po given for HA. Patient subsequently got extremely nauseated and vomited 400 cc white/thin liquid. Freda Bermudez NP notified orders received for Phenergan 12.5 and U/S of abd. Ordered.

## 2022-11-10 NOTE — PROGRESS NOTES
TRANSFER - OUT REPORT:    Verbal report given to Art RN on Aruna Redding  being transferred to [facility] for routine progression of patient care       Report consisted of patient's Situation, Background, Assessment and   Recommendations(SBAR). Information from the following report(s) Nurse Handoff Report was reviewed with the receiving nurse. [place] Assessment: Presents to emergency department  because of falls (Syncope, seizure, or loss of consciousness): No, Age > 79: Yes, Altered Mental Status, Intoxication with alcohol or substance confusion (Disorientation, impaired judgment, poor safety awaremess, or inability to follow instructions): No, Impaired Mobility: Ambulates or transfers with assistive devices or assistance; Unable to ambulate or transer.: No, Nursing Judgement: No  Lines:   Peripheral IV 11/09/22 Left; Anterior Antecubital (Active)   Site Assessment Clean, dry & intact 11/10/22 1212   Line Status Flushed; Infusing 11/10/22 1212   Line Care Connections checked and tightened 11/10/22 1212   Phlebitis Assessment No symptoms 11/10/22 1212   Infiltration Assessment 0 11/10/22 1212   Alcohol Cap Used Yes 11/10/22 1212   Dressing Status Clean, dry & intact 11/10/22 1212   Dressing Type Transparent 11/10/22 1212        Opportunity for questions and clarification was provided.       Patient transported with:

## 2022-11-10 NOTE — PROGRESS NOTES
Patient complaining of nausea and stomach/chest pain. Dr. Oskar Painter notified of pain, Dr. Oskar Painter stated pain is non-cardiac. Orders received for Zofran and GI cocktail see mar.

## 2022-11-10 NOTE — PROGRESS NOTES
[facility] CARDIOLOGY PROGRESS NOTE    11/10/2022 7:22 AM    Admit Date: 11/9/2022        Subjective:   Stable overnight without angina, CHF, or palpitations. Vitals stable and controlled. No other complaints overnight. Tolerating meds well. Remains in sinus bradycardia but asymptomatic, holding Ziac since admission. Objective:      Vitals:    11/09/22 2108 11/09/22 2130 11/10/22 0054 11/10/22 0507   BP: (!) 165/83 (!) 160/58 (!) 168/61 (!) 148/66   Pulse: 50 (!) 48 (!) 46 (!) 45   Resp: 17  17 18   Temp: 97.9 °F (36.6 °C)  97.5 °F (36.4 °C) 97.7 °F (36.5 °C)   TempSrc: Oral  Oral Oral   SpO2: 97%  96% 94%   Weight:    135 lb 8 oz (61.5 kg)   Height:           Physical Exam:  Neck- supple, no JVD  CV- regular rate and rhythm no MRG  Lung- clear bilaterally  Abd- soft, nontender, nondistended  Ext- no edema  Skin- warm and dry    Data Review:   Recent Labs     11/09/22  1239 11/09/22  2000 11/10/22  0408     --   --    K 3.7  --   --    MG 2.2  --   --    BUN 13  --   --    WBC 2.7*  --  2.8*   HGB 13.6  --  12.8   HCT 41.6  --  38.6     --  192   CHOL  --  132  --    HDL  --  49  --        Assessment and Plan: Active Hospital Problem:      Bradycardia-asymptomatic, possibly iatrogenic. Holding Ziac. Follow telemetry. Consider pacemaker if remains significantly bradycardic especially if any symptoms over the next 24 hours. Hypertension, uncontrolled-remains elevated but asymptomatic at the present time and euvolemic, continue meds and titrate after catheterization      *ACS (acute coronary syndrome) (HCC)-stable overnight with no angina. Troponins negative. Left heart catheterization today. Benefits and risk discussed with the patient and her family. They understand and wish to proceed. The benefits and risks of left heart catheterization and possible percutaneous intervention were discussed with the patient.   Risks including but not limited to bleeding, infection, contrast allergy reaction, acute kidney injury, MI, stroke, emergent CABG and death were discussed. The patient understands the risks of the procedure and wishes to proceed.    Rosita Chacon MD

## 2022-11-10 NOTE — PROGRESS NOTES
On phone with Luis M Adams Senior  Navigator in pts room. attempting to address concerns about [facility] pt is scheduled for today. Pt has confirmed that she understands the procedure. Consent has been signed with the assistance of an . Opportunity for questions was given. Patient confirmed that she understand the procedure. Consent placed in chart.

## 2022-11-11 ENCOUNTER — TELEPHONE (OUTPATIENT)
Dept: CARDIOLOGY CLINIC | Age: 82
End: 2022-11-11

## 2022-11-11 VITALS
RESPIRATION RATE: 17 BRPM | WEIGHT: 141.3 LBS | OXYGEN SATURATION: 91 % | HEART RATE: 72 BPM | HEIGHT: 60 IN | TEMPERATURE: 98.6 F | BODY MASS INDEX: 27.74 KG/M2 | SYSTOLIC BLOOD PRESSURE: 137 MMHG | DIASTOLIC BLOOD PRESSURE: 54 MMHG

## 2022-11-11 PROBLEM — R07.2 PRECORDIAL PAIN: Status: ACTIVE | Noted: 2022-11-11

## 2022-11-11 LAB
ANION GAP SERPL CALC-SCNC: 4 MMOL/L (ref 2–11)
BASOPHILS # BLD: 0 K/UL (ref 0–0.2)
BASOPHILS NFR BLD: 0 % (ref 0–2)
BUN SERPL-MCNC: 17 MG/DL (ref 8–23)
CALCIUM SERPL-MCNC: 9.8 MG/DL (ref 8.3–10.4)
CHLORIDE SERPL-SCNC: 108 MMOL/L (ref 101–110)
CO2 SERPL-SCNC: 25 MMOL/L (ref 21–32)
CREAT SERPL-MCNC: 0.9 MG/DL (ref 0.6–1)
DIFFERENTIAL METHOD BLD: ABNORMAL
EOSINOPHIL # BLD: 0 K/UL (ref 0–0.8)
EOSINOPHIL NFR BLD: 0 % (ref 0.5–7.8)
ERYTHROCYTE [DISTWIDTH] IN BLOOD BY AUTOMATED COUNT: 12.9 % (ref 11.9–14.6)
GLUCOSE SERPL-MCNC: 112 MG/DL (ref 65–100)
HCT VFR BLD AUTO: 38 % (ref 35.8–46.3)
HGB BLD-MCNC: 12.6 G/DL (ref 11.7–15.4)
IMM GRANULOCYTES # BLD AUTO: 0 K/UL (ref 0–0.5)
IMM GRANULOCYTES NFR BLD AUTO: 0 % (ref 0–5)
LYMPHOCYTES # BLD: 0.8 K/UL (ref 0.5–4.6)
LYMPHOCYTES NFR BLD: 17 % (ref 13–44)
MAGNESIUM SERPL-MCNC: 2.1 MG/DL (ref 1.8–2.4)
MCH RBC QN AUTO: 30.9 PG (ref 26.1–32.9)
MCHC RBC AUTO-ENTMCNC: 33.2 G/DL (ref 31.4–35)
MCV RBC AUTO: 93.1 FL (ref 82–102)
MONOCYTES # BLD: 0.2 K/UL (ref 0.1–1.3)
MONOCYTES NFR BLD: 4 % (ref 4–12)
NEUTS SEG # BLD: 3.9 K/UL (ref 1.7–8.2)
NEUTS SEG NFR BLD: 79 % (ref 43–78)
NRBC # BLD: 0 K/UL (ref 0–0.2)
PLATELET # BLD AUTO: 200 K/UL (ref 150–450)
PMV BLD AUTO: 9.7 FL (ref 9.4–12.3)
POTASSIUM SERPL-SCNC: 3.5 MMOL/L (ref 3.5–5.1)
RBC # BLD AUTO: 4.08 M/UL (ref 4.05–5.2)
SODIUM SERPL-SCNC: 137 MMOL/L (ref 133–143)
WBC # BLD AUTO: 5 K/UL (ref 4.3–11.1)

## 2022-11-11 PROCEDURE — 6370000000 HC RX 637 (ALT 250 FOR IP): Performed by: INTERNAL MEDICINE

## 2022-11-11 PROCEDURE — 99238 HOSP IP/OBS DSCHRG MGMT 30/<: CPT | Performed by: INTERNAL MEDICINE

## 2022-11-11 PROCEDURE — 36415 COLL VENOUS BLD VENIPUNCTURE: CPT

## 2022-11-11 PROCEDURE — 80048 BASIC METABOLIC PNL TOTAL CA: CPT

## 2022-11-11 PROCEDURE — 85025 COMPLETE CBC W/AUTO DIFF WBC: CPT

## 2022-11-11 PROCEDURE — 6370000000 HC RX 637 (ALT 250 FOR IP): Performed by: PHYSICIAN ASSISTANT

## 2022-11-11 PROCEDURE — 83735 ASSAY OF MAGNESIUM: CPT

## 2022-11-11 RX ORDER — POTASSIUM CHLORIDE 20 MEQ/1
40 TABLET, EXTENDED RELEASE ORAL ONCE
Status: COMPLETED | OUTPATIENT
Start: 2022-11-11 | End: 2022-11-11

## 2022-11-11 RX ORDER — VALSARTAN 160 MG/1
160 TABLET ORAL EVERY 12 HOURS SCHEDULED
Status: DISCONTINUED | OUTPATIENT
Start: 2022-11-11 | End: 2022-11-11 | Stop reason: HOSPADM

## 2022-11-11 RX ORDER — PANTOPRAZOLE SODIUM 40 MG/1
40 TABLET, DELAYED RELEASE ORAL
Status: DISCONTINUED | OUTPATIENT
Start: 2022-11-11 | End: 2022-11-11 | Stop reason: HOSPADM

## 2022-11-11 RX ORDER — AMLODIPINE BESYLATE 5 MG/1
5 TABLET ORAL DAILY
Qty: 30 TABLET | Refills: 3 | Status: SHIPPED | OUTPATIENT
Start: 2022-11-11

## 2022-11-11 RX ORDER — ATORVASTATIN CALCIUM 40 MG/1
40 TABLET, FILM COATED ORAL NIGHTLY
Qty: 30 TABLET | Refills: 3 | Status: SHIPPED | OUTPATIENT
Start: 2022-11-11

## 2022-11-11 RX ORDER — ASPIRIN 81 MG/1
81 TABLET, CHEWABLE ORAL DAILY
COMMUNITY
Start: 2022-11-11

## 2022-11-11 RX ORDER — PANTOPRAZOLE SODIUM 40 MG/1
40 TABLET, DELAYED RELEASE ORAL
Qty: 30 TABLET | Refills: 1 | Status: SHIPPED | OUTPATIENT
Start: 2022-11-11

## 2022-11-11 RX ADMIN — AMLODIPINE BESYLATE 5 MG: 5 TABLET ORAL at 09:20

## 2022-11-11 RX ADMIN — POTASSIUM CHLORIDE 40 MEQ: 1500 TABLET, EXTENDED RELEASE ORAL at 09:19

## 2022-11-11 RX ADMIN — ASPIRIN 81 MG: 81 TABLET, CHEWABLE ORAL at 09:19

## 2022-11-11 RX ADMIN — ACETAMINOPHEN 650 MG: 325 TABLET ORAL at 03:52

## 2022-11-11 RX ADMIN — PANTOPRAZOLE SODIUM 40 MG: 40 TABLET, DELAYED RELEASE ORAL at 09:19

## 2022-11-11 ASSESSMENT — PAIN SCALES - GENERAL
PAINLEVEL_OUTOF10: 3
PAINLEVEL_OUTOF10: 0

## 2022-11-11 ASSESSMENT — PAIN DESCRIPTION - LOCATION: LOCATION: HEAD

## 2022-11-11 NOTE — PROGRESS NOTES
[facility] CARDIOLOGY PROGRESS NOTE    11/11/2022 7:04 AM    Admit Date: 11/9/2022        Subjective:   Stable overnight without angina, CHF, or palpitations. Lying flat comfortably. Blood pressure trending downwards. Heart rate higher after cessation of beta-blocker. Vitals stable and controlled. No other complaints overnight. Tolerating meds well. Objective:      Vitals:    11/10/22 1838 11/10/22 2000 11/11/22 0044 11/11/22 0400   BP: (!) 157/65 128/74 135/67 (!) 156/65   Pulse: 84 91 82 70   Resp: 16 18 16 20   Temp:  97.7 °F (36.5 °C) 98 °F (36.7 °C) 99 °F (37.2 °C)   TempSrc: Oral Temporal Temporal Temporal   SpO2: 93% 96% 97% 96%   Weight:    141 lb 4.8 oz (64.1 kg)   Height:           Physical Exam:  Neck- supple, no JVD  CV- regular rate and rhythm no MRG  Lung- clear bilaterally  Abd- soft, nontender, nondistended  Ext- no edema, right wrist and right groin both stable with no hematoma or ecchymosis  Skin- warm and dry    Data Review:   Recent Labs     11/09/22  1239 11/09/22  2000 11/10/22  0408 11/11/22  0334     --   --  137   K 3.7  --   --  3.5   MG 2.2  --   --  2.1   BUN 13  --   --  17   WBC 2.7*  --  2.8* 5.0   HGB 13.6  --  12.8 12.6   HCT 41.6  --  38.6 38.0     --  192 200   CHOL  --  132  --   --    HDL  --  49  --   --        Assessment and Plan: Active Hospital Problem:       Bradycardia-asymptomatic, iatrogenic and resolved now. Stopped Ziac at discharge. Does not appear to need consideration for pacemaker at present. Hypertension, uncontrolled-remains elevated but asymptomatic at the present time and euvolemic, continue meds and titrate meds at discharge (see below)       *ACS (acute coronary syndrome) (HCC)-stable anatomy with no PCI needed on yesterday's heart cath. Access sites clear. Labs stable. Blood pressure trending down. Add Protonix 40 mg daily at discharge.     Home after breakfast  Stop Ziac  Add amlodipine 5 mg daily and continue olmesartan 40 mg daily  Add atorvastatin with outpatient surveillance  Minimize sodium in the outpatient setting, 64 ounce fluid intake daily  Add Protonix 40 mg daily  Follow-up with primary cardiologist in a couple of weeks     Darrick Hughes MD

## 2022-11-11 NOTE — PROGRESS NOTES
Skin assessment completed. Sacrum and heels intact, no redness present. No skin abnormalities noted.

## 2022-11-11 NOTE — PROGRESS NOTES
Patient speaks Wolof as their preferred language for their healthcare communication. If there are technical problems using the [employer] mobil unit, please contact Language Services for interpretation at:    Senior Samantha -Navigator ([phone])  General phone: [phone] ( [phone])  Email: [email or web address]    Please always document the use of interpreting services (name and/or 's ID number) in your clinical notes. Our interpreters are available for team members working with limited  English proficient (LEP) patients remotely, in person (if needed for special cases), as phone or video interpreters on the [employer] units.         Thank you,        [facility]  Senior /Navigator

## 2022-11-11 NOTE — PROGRESS NOTES
Discharge instructions reviewed with patient and family including follow up appointments, activity restrictions, medications that are being started and stopped and signs/symptoms to return to the ER for.  used for assessment, med administration and to review discharge instructions. Patient and family verbalized understanding.

## 2022-11-11 NOTE — TELEPHONE ENCOUNTER
----- Message from Diallo Yoo sent at 11/11/2022  9:16 AM EST -----    ----- Message -----  From: Tanya Blanco PA-C  Sent: 11/11/2022   8:22 AM EST  To: , #    Pt admitted with CP, HTN and bradycardia- cath and echo- no intervention. Needs f/u in 2-3 weeks  Please schedule and call Pt with appt.  ARISTEO on Friday 11/11

## 2022-11-11 NOTE — DISCHARGE SUMMARY
[facility] Cardiology Discharge Summary     Patient ID:  Jamaal Moran  [id number]  64 y.o.  1940    Admit date: 11/9/2022    Discharge date:  11/10/2022    Admitting Physician: France Gonzalez MD     Discharge Physician: Dr. Oskar Painter    Admission Diagnoses: Bradycardia [R00.1]  Chest pain [R07.9]  ACS (acute coronary syndrome) ([facility] [address] [I24.9]  Hypertension, uncontrolled [I10]    Discharge Diagnoses:   Patient Active Problem List    Diagnosis Date Noted    Other forms of angina pectoris ([facility] [address] 11/10/2022    Bradycardia 11/10/2022    Hypertension, uncontrolled 11/10/2022    ACS (acute coronary syndrome) ([facility] 11/09/2022       Cardiology Procedures this admission:  Diagnostic left heart catheterization  EchoCardiogram  Consults: none    Hospital Course: Patient presented to the ER with complaints of chest pain. Upon arrival to the ER, patient was found to be significantly hypertensive and bradycardic. Patient was admitted to telemetry for continued care. BB stopped on admission. Echocardiogram was done and showed:    Left Ventricle: Normal left ventricular systolic function with a visually estimated EF of 60 - 65%. Left ventricle size is normal. Mildly increased wall thickness. Mild basal septal thickening. Normal wall motion. Abnormal diastolic function. Aortic Valve: Valve structure is normal. Mild sclerosis of the aortic valve cusp. Mild regurgitation. Mild stenosis of the aortic valve. Mitral Valve: Valve structure is normal. Mild annular calcification at the posterior leaflet of the mitral valve. Mild regurgitation. Tricuspid Valve: The estimated RVSP is 33 mmHg. Left Atrium: Left atrium is severely dilated. Aorta: Mildly dilated aortic root. Mildly dilated ascending aorta. Patient underwent cardiac catheterization by Dr. Oskar Painter on 11/9/22. Patient was found to have mild nonobstructive CAD.  Patient tolerated the procedure well and was taken to the telemetry floor for recovery. BP meds added with improvement in BP control. The morning of discharge, patient was up feeling well without any complaints of chest pain or shortness of breath. Patient's right groin cath site was clean, dry and intact without hematoma or bruit. Patient's labs were WNL. Patient was seen and examined by Dr. Jaylyn Charles and determined stable and ready for discharge. Patient was instructed on the importance of medication compliance including taking aspirin everyday without missing a dose. Patient will continue high-intensity statin. The patient will follow up with [facility] -- office will call. DISPOSITION: The patient is being discharged home in stable condition on a low saturated fat, low cholesterol and low salt diet. The patient is instructed to advance activities as tolerated to the limit of fatigue or shortness of breath. The patient is instructed to avoid all heavy lifting, straining, stooping or squatting for 3-5 days. The patient is instructed to watch the cath site for bleeding/oozing; if seen, the patient is instructed to apply firm pressure with a clean cloth and call [facility] at [phone]. The patient is instructed to watch for signs of infection which include: increasing area of redness, fever/hot to touch or purulent drainage at the catheterization site. The patient is instructed not to soak in a bathtub for 7-10 days, but is cleared to shower. The patient is instructed to call the office or return to the ER for immediate evaluation for any shortness of breath or chest pain not relieved by NTG. Discharge Exam: Patient has been seen by Dr. Jaylyn Charles: see his progress note for exam details.     BP (!) 157/65   Pulse 84   Temp 98.4 °F (36.9 °C) (Oral)   Resp 16   Ht 5' (1.524 m)   Wt 135 lb 8 oz (61.5 kg)   SpO2 93%   BMI 26.46 kg/m²     Recent Results (from the past 24 hour(s))   CBC    Collection Time: 11/10/22  4:08 AM   Result Value Ref Range    WBC 2.8 (L) 4.3 - 11.1 K/uL    RBC 4.12 4.05 - 5.2 M/uL    Hemoglobin 12.8 11.7 - 15.4 g/dL    Hematocrit 38.6 35.8 - 46.3 %    MCV 93.7 82 - 102 FL    MCH 31.1 26.1 - 32.9 PG    MCHC 33.2 31.4 - 35.0 g/dL    RDW 13.0 11.9 - 14.6 %    Platelets 787 334 - 646 K/uL    MPV 9.6 9.4 - 12.3 FL    nRBC 0.00 0.0 - 0.2 K/uL   Brain Natriuretic Peptide    Collection Time: 11/10/22  4:08 AM   Result Value Ref Range    NT Pro- (H) <450 PG/ML   Cardiac procedure    Collection Time: 11/10/22  3:33 PM   Result Value Ref Range    Body Surface Area 1.63 m2         Patient Instructions:   Current Discharge Medication List     START taking these medications    Details   atorvastatin (LIPITOR) 40 MG tablet Take 1 tablet by mouth nightly  Qty: 30 tablet, Refills: 3      amLODIPine (NORVASC) 5 MG tablet Take 1 tablet by mouth daily  Qty: 30 tablet, Refills: 3      pantoprazole (PROTONIX) 40 MG tablet Take 1 tablet by mouth every morning (before breakfast)  Qty: 30 tablet, Refills: 1      aspirin 81 MG chewable tablet Take 1 tablet by mouth daily           CONTINUE these medications which have NOT CHANGED    Details   meloxicam (MOBIC) 7.5 MG tablet Take 7.5 mg by mouth 2 times daily (with meals)      olmesartan (BENICAR) 40 MG tablet Take 40 mg by mouth daily (before lunch)           STOP taking these medications       bisoprolol-hydroCHLOROthiazide (ZIAC) 2.5-6.25 MG per tablet Comments:   Reason for Stopping: bradycardia              Dr. Macy Paulino, PA-C    Attending addendum: See my note from this morning. Doing well with no angina, CHF, or arrhythmic symptoms. Meds adjusted as noted above. Follow-up arranged. Low-sodium diet encouraged, stay hydrated and stay active, follow-up with primary cardiologist arranged.

## 2022-11-11 NOTE — CARE COORDINATION
Discharge order is in. Pt presented to the ED c/o chest pain. Upon arrival to the ED, pt was found to be significantly hypertensive and bradycardic. Pt underwent cardiac catheterization by Dr. Janessa Keith on 11/9/22 and was found to have mild nonobstructive CAD. Pt tolerated the procedure well an dis now discharging home in stable condition. Family at bedside. Pt lives with her granddtr. Is mostly indep with her ADLs but receives help from family. On RA. Denies DME need. Denies current home care services. Through  services and family, pt has a PCP but does not remember the name of the doctor or practice. Insurance verified. Able to afford meds. No discharge needs identified. Tx goals met. 11/11/22 0905   Service Assessment   Patient Orientation Alert and Oriented   Cognition Alert   History Provided By Patient   Primary [facility];Family Members;[employer] [facility];Friends/Neighbors   PCP Verified by CM Yes  (Has a PCP but does not remember the name of the doctor or practice)   Prior Functional Level Independent in ADLs/IADLs   Current Functional Level Independent in ADLs/IADLs   Can patient return to prior living arrangement Yes   Ability to make needs known: Good   Family able to assist with home care needs: Yes   Would you like for me to discuss the discharge plan with any other family members/significant others, and if so, who? No   Social/Functional History   Lives With Family   Type of [address] One level   ADL Assistance Independent   Ambulation Assistance Independent   Active  No   Mode of Transportation Family   Occupation Retired   Discharge Planning   Current Services Prior To Admission None   Potential Assistance Needed N/A   DME Ordered?  No   Potential Assistance Purchasing Medications No   Type of Home Care Services None   Patient expects to be discharged to: [facility] Discharge   Transition of Care Consult (CM Consult) Discharge [address] [address] Discharge None   [employer] Information Provided? No   Mode of Transport at Discharge Other (see comment)  (Family)   Confirm Follow Up Transport Family   Condition of Participation: Discharge Planning   The Patient and/or Patient Representative was provided with a Choice of Provider? Patient   The Patient and/Or Patient Representative agree with the Discharge Plan? Yes   Freedom of Choice list was provided with basic dialogue that supports the patient's individualized plan of care/goals, treatment preferences, and shares the quality data associated with the providers?   Yes

## 2022-12-07 ENCOUNTER — OFFICE VISIT (OUTPATIENT)
Dept: CARDIOLOGY CLINIC | Age: 82
End: 2022-12-07
Payer: COMMERCIAL

## 2022-12-07 VITALS
HEART RATE: 66 BPM | DIASTOLIC BLOOD PRESSURE: 72 MMHG | BODY MASS INDEX: 27.64 KG/M2 | SYSTOLIC BLOOD PRESSURE: 180 MMHG | WEIGHT: 140.8 LBS | HEIGHT: 60 IN

## 2022-12-07 DIAGNOSIS — I71.20 THORACIC AORTIC ANEURYSM WITHOUT RUPTURE, UNSPECIFIED PART: Primary | ICD-10-CM

## 2022-12-07 DIAGNOSIS — I10 HYPERTENSION, UNSPECIFIED TYPE: ICD-10-CM

## 2022-12-07 DIAGNOSIS — I25.10 CAD IN NATIVE ARTERY: ICD-10-CM

## 2022-12-07 DIAGNOSIS — I35.0 MILD AORTIC STENOSIS BY PRIOR ECHOCARDIOGRAM: ICD-10-CM

## 2022-12-07 PROCEDURE — 99214 OFFICE O/P EST MOD 30 MIN: CPT | Performed by: INTERNAL MEDICINE

## 2022-12-07 PROCEDURE — 3078F DIAST BP <80 MM HG: CPT | Performed by: INTERNAL MEDICINE

## 2022-12-07 PROCEDURE — 1123F ACP DISCUSS/DSCN MKR DOCD: CPT | Performed by: INTERNAL MEDICINE

## 2022-12-07 PROCEDURE — 3074F SYST BP LT 130 MM HG: CPT | Performed by: INTERNAL MEDICINE

## 2022-12-07 RX ORDER — QUETIAPINE FUMARATE 25 MG/1
TABLET, FILM COATED ORAL
COMMUNITY

## 2022-12-07 RX ORDER — MEMANTINE HYDROCHLORIDE 5 MG/1
TABLET ORAL
COMMUNITY

## 2022-12-07 NOTE — PROGRESS NOTES
[facility] CARDIOLOGY Follow Up                 Reason for Visit: Posthospitalization follow-up    Subjective:     Patient is a [de-identified] y.o. female with a PMH of nonobstructive CAD, hypertension, and rheumatoid arthritis who presents as a posthospitalization follow-up. The patient presented to the hospital with chest pain. The patient had a TTE demonstrating a normal EF, mild MR, mild AS, and mild AI. Her ascending aorta was noted to be 3.8 cm. She had a LHC on November 10 and was noted to demonstrate nonobstructive CAD. The patient denies angina and dyspnea. No past medical history on file. Past Surgical History:   Procedure Laterality Date    CARDIAC PROCEDURE N/A 11/10/2022    LEFT HEART CATH / CORONARY ANGIOGRAPHY performed by Doris Moore MD at [address] [place] [address]      No family history on file. Social History     Tobacco Use    Smoking status: Not on file    Smokeless tobacco: Not on file   Substance Use Topics    Alcohol use: Not on file      Allergies   Allergen Reactions    Penicillins Hives, Rash and Other (See Comments)         ROS:  No obvious pertinent positives on review of systems except for what was outlined above.        Objective:       BP (!) 180/72   Pulse 66   Ht 5' (1.524 m)   Wt 140 lb 12.8 oz (63.9 kg)   BMI 27.50 kg/m²     BP Readings from Last 3 Encounters:   12/07/22 (!) 180/72   11/11/22 (!) 137/54       Wt Readings from Last 3 Encounters:   12/07/22 140 lb 12.8 oz (63.9 kg)   11/11/22 141 lb 4.8 oz (64.1 kg)       General/Constitutional:   Alert and oriented x 3, no acute distress  HEENT:   normocephalic, atraumatic, moist mucous membranes  Neck:   No JVD or carotid bruits bilaterally  Cardiovascular:   regular rate and rhythm, no rub/gallop appreciated  Pulmonary:   clear to auscultation bilaterally, no respiratory distress  Abdomen:   soft, non-tender, non-distended  Ext:   No sig LE edema bilaterally  Skin:  warm and dry, no obvious rashes seen  Neuro:   no obvious sensory or motor deficits  Psychiatric:   normal mood and affect    Data Review:   Lab Results   Component Value Date    CHOL 132 11/09/2022     Lab Results   Component Value Date    TRIG 69 11/09/2022     Lab Results   Component Value Date    HDL 49 11/09/2022     Lab Results   Component Value Date    LDLCALC 69.2 11/09/2022     Lab Results   Component Value Date    LABVLDL 13.8 11/09/2022     Lab Results   Component Value Date    CHOLHDLRATIO 2.7 11/09/2022        Lab Results   Component Value Date/Time     11/11/2022 03:34 AM     11/09/2022 12:39 PM    K 3.5 11/11/2022 03:34 AM    K 3.7 11/09/2022 12:39 PM     11/11/2022 03:34 AM     11/09/2022 12:39 PM    CO2 25 11/11/2022 03:34 AM    CO2 31 11/09/2022 12:39 PM    BUN 17 11/11/2022 03:34 AM    BUN 13 11/09/2022 12:39 PM    CREATININE 0.90 11/11/2022 03:34 AM    CREATININE 0.90 11/09/2022 12:39 PM    GLUCOSE 112 11/11/2022 03:34 AM    GLUCOSE 108 11/09/2022 12:39 PM    CALCIUM 9.8 11/11/2022 03:34 AM    CALCIUM 9.8 11/09/2022 12:39 PM         Lab Results   Component Value Date    ALT 23 11/09/2022    AST 15 11/09/2022        Assessment/Plan:   1. Thoracic aortic aneurysm without rupture, unspecified part  - Obtain a thoracic CTA to confirm or refute the diagnosis    2. CAD in native artery  - Continue with baby aspirin daily and Lipitor    3. Hypertension, unspecified type  - Well-controlled  - Continue with amlodipine  - Currently on olmesartan  - Recommend measuring BP at home and submitting measurements to clinic    4.  Mild aortic stenosis by prior echocardiogram  - Surveillance echocardiogram in 3 to 5 years    F/U: 6 months    Karol Velasco MD

## 2022-12-19 ENCOUNTER — HOSPITAL ENCOUNTER (OUTPATIENT)
Dept: CT IMAGING | Age: 82
Discharge: HOME | End: 2022-12-22
Payer: COMMERCIAL

## 2022-12-19 DIAGNOSIS — I71.20 THORACIC AORTIC ANEURYSM WITHOUT RUPTURE, UNSPECIFIED PART: ICD-10-CM

## 2022-12-19 LAB — CREAT BLD-MCNC: 1.11 MG/DL (ref 0.8–1.5)

## 2022-12-19 PROCEDURE — 82565 ASSAY OF CREATININE: CPT

## 2022-12-19 PROCEDURE — 71275 CT ANGIOGRAPHY CHEST: CPT

## 2022-12-19 PROCEDURE — 6360000004 HC RX CONTRAST MEDICATION: Performed by: INTERNAL MEDICINE

## 2022-12-19 RX ORDER — SODIUM CHLORIDE 0.9 % (FLUSH) 0.9 %
10 SYRINGE (ML) INJECTION
Status: DISCONTINUED | OUTPATIENT
Start: 2022-12-19 | End: 2022-12-23 | Stop reason: HOSPADM

## 2022-12-19 RX ORDER — 0.9 % SODIUM CHLORIDE 0.9 %
100 INTRAVENOUS SOLUTION INTRAVENOUS ONCE
Status: DISCONTINUED | OUTPATIENT
Start: 2022-12-19 | End: 2022-12-23 | Stop reason: HOSPADM

## 2022-12-19 RX ADMIN — IOPAMIDOL 75 ML: 755 INJECTION, SOLUTION INTRAVENOUS at 15:50

## 2022-12-20 ENCOUNTER — TELEPHONE (OUTPATIENT)
Dept: CARDIOLOGY CLINIC | Age: 82
End: 2022-12-20

## 2022-12-20 NOTE — TELEPHONE ENCOUNTER
----- Message from Malaika Delgado MD sent at 12/19/2022  4:45 PM EST -----  Please let the patient know that the patient has a 4.3 cm ascending aortic aneurysm. This warrants a surveillance CTA in 1 year.

## (undated) DEVICE — CATHETER COR DIAG 4.0 5FR 110CM 2 SIDE H

## (undated) DEVICE — CATHETER ANGIO 4FR L100CM S STL NYL MPA 2 W/ 2 SIDE H 3 SEG

## (undated) DEVICE — INTRODUCER SHTH 5FR CANN L11CM GWIRE 0.038IN STD KINK

## (undated) DEVICE — CATHETER ANGIO 4FR L100CM S STL NYL JL3.5 3 SEG BRAID SFT

## (undated) DEVICE — CATHETER DIAG 4FR L100CM COR NYL 3 DRC W/O SIDE H RADPQ

## (undated) DEVICE — HI-TORQUE VERSACORE MODIFIED J GUIDE WIRE SYSTEM 260 CM: Brand: HI-TORQUE VERSACORE

## (undated) DEVICE — GUIDEWIRE 035IN 210CM PTFE COAT FIX COR J TIP 15MM FIRM BODY

## (undated) DEVICE — DEVICE COMPR REG 24 CM VASC BND

## (undated) DEVICE — CATHETER DIAG AD 4FR L100CM STD NYL JUDKINS R 4 TRULUMEN

## (undated) DEVICE — CATHETER ANGIO 4FR L100CM S STL NYL JL4 3 SEG BRAID SFT

## (undated) DEVICE — GLIDESHEATH SLENDER STAINLESS STEEL KIT: Brand: GLIDESHEATH SLENDER

## (undated) DEVICE — CATHETER DIAG AD 5FR L110CM 145DEG COR GRY HYDRPHLC NYL